# Patient Record
(demographics unavailable — no encounter records)

---

## 2025-01-06 NOTE — PHYSICAL EXAM
[Well Developed] : well developed [NAD] : in no acute distress [PERRL] : pupils were equal, round, reactive to light  [Moist & Pink Mucous Membranes] : moist and pink mucous membranes [CTAB] : lungs clear to auscultation bilaterally [Regular Rate and Rhythm] : regular rate and rhythm [Normal S1, S2] : normal S1 and S2 [Soft] : soft  [Normal Bowel Sounds] : normal bowel sounds [No HSM] : no hepatosplenomegaly appreciated [Normal Tone] : normal tone [Well-Perfused] : well-perfused [Interactive] : interactive [icteric] : anicteric [Respiratory Distress] : no respiratory distress  [Distended] : non distended [Tender] : non tender [Normal rectal exam] : exam was normal [Normal External Genitalia] : normal external genitalia [Amos Stage ___] : Amos stage [unfilled] [Edema] : no edema [Cyanosis] : no cyanosis [Rash] : no rash [Jaundice] : no jaundice [FreeTextEntry1] : with eyeglasses

## 2025-01-06 NOTE — CONSULT LETTER
[Dear  ___] : Dear  [unfilled], [Consult Letter:] : I had the pleasure of evaluating your patient, [unfilled]. [Please see my note below.] : Please see my note below. [Consult Closing:] : Thank you very much for allowing me to participate in the care of this patient.  If you have any questions, please do not hesitate to contact me. [Sincerely,] : Sincerely, [FreeTextEntry3] : Jose Rafael Coto MD Division of Pediatric Gastroenterology [facility]

## 2025-01-06 NOTE — ASSESSMENT
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 DGBIs are typically a diagnosis of exclusion in individuals meeting symptomatic criteria; and given maternal uncle with Crohn's disease would assess further for a systemic or inflammatory autoimmune process including celiac disease. Other potential etiologies include but are not limited to infection, lactose intolerance or other malabsorptive process including sugar alcohols as well as small intestinal bacterial overgrowth.   Plan: lab assessment stool eval diet changes - remove all soda and caffeine robert Dr. Pepper consider trial of probiotic consider trial off dairy f/u appt with an event diary consider further assessment with lactose breath test pending clinical status and above results

## 2025-01-06 NOTE — HISTORY OF PRESENT ILLNESS
[de-identified] : 15 year old female with frequent bowel movements.  Has to run to the bathroom after eating.  Has up to 6 BMs a day, [place] 7. No blood in stool.  No soiling. Unsure when changed to loose stool.  Doesn't wake at night to defecate. No c/o abd pain. Nausea at times. No vomiting. No inc eructation. Inc flatulence. LMP - Dec 15, 2024 ROS: eyeglasses Diet Hx obtained water, Dr. Pepper, no coffee or milk oatmeal or pancakes or eggs, pizza, meat or chicken ROS: eyeglasses No Sx Family Hx: mat 1/2 uncle Crohn's disease, sister with neutropenia which resolved, mother and MGM - IBS Mother realtor

## 2025-02-21 NOTE — CONSULT LETTER
[Dear  ___] : Dear  [unfilled], [Courtesy Letter:] : I had the pleasure of seeing your patient, [unfilled], in my office today. [Please see my note below.] : Please see my note below. [Consult Closing:] : Thank you very much for allowing me to participate in the care of this patient.  If you have any questions, please do not hesitate to contact me. [Sincerely,] : Sincerely, [FreeTextEntry3] : JONATHAN Chilel MD Division of Pediatric Gastroenterology [facility]

## 2025-02-21 NOTE — PHYSICAL EXAM
[Well Developed] : well developed [NAD] : in no acute distress [Moist & Pink Mucous Membranes] : moist and pink mucous membranes [CTAB] : lungs clear to auscultation bilaterally [Soft] : soft  [No HSM] : no hepatosplenomegaly appreciated [Normal Tone] : normal tone [Well-Perfused] : well-perfused [Interactive] : interactive [icteric] : anicteric [Respiratory Distress] : no respiratory distress  [Distended] : non distended [Tender] : non tender [Edema] : no edema [Cyanosis] : no cyanosis [Rash] : no rash [Jaundice] : no jaundice [FreeTextEntry1] : with eyeglasses

## 2025-02-21 NOTE — HISTORY OF PRESENT ILLNESS
[Crohn's Disease] : Crohn's Disease  [Ileum] : ileum [Colon] : colon [Perianal Disease] : The patient does not have perianal disease. [de-identified] : 2025 [de-identified] : 15 year old just diagnosed with ileocolonic Crohn's disease after presenting with anemia, slow weight gain, elevated inflammatory markers and frequent bowel movements.  She has an upper endoscopy with colonoscopy on Feb 18th which demonstrated a grossly and histologically normal upper endoscopy. Colonoscopy with patches of erythematous mucosa with areas of pus and loss of normal vascular pattern, worse in the proximal colon, cobble stoning in transverse colon & TI- biopsies consistent with Crohns both grossly and histologically  Here today to discuss diagnosis and next steps. She has infrequent abdominal pain, bowel movements are 6-8x/day, semi formed, no gross blood. Occasional urgency. No nocturnal awakening. + Weight loss despite reports of excellent appetite. Very active in theatre program at school. Parents plan to obtain 2nd opinion.   Feb 2025 Varicella IgG + Hep B antigen -, Hep B antibody + Quantiferon -  Calprotectin 4210  ROS: eyeglasses No Sx Family Hx: maternal uncle fistulizing Crohn's disease with ostomy, sister with congenital neutropenia

## 2025-02-21 NOTE — ASSESSMENT
[Educated Patient & Family about Diagnosis] : educated the patient and family about the diagnosis [FreeTextEntry1] : Natalie is a 15 year old female newly diagnosed with ileocolonic Crohn's disease after presenting with slow weight gain, iron def anemia with elevated inflammatory markers and frequent bowel movements. Today we reviewed the IBD diagnosis in detail including pathophysiology, natural history, important extraintestinal manifestations, and risk of complicated disease behavior over time. We further discussed the treat to target paradigm of therapy and various treatment options that exist today for IBD.   Of the available treatment options, we discussed anti-TNF medications, specifically infliximab, in more detail including both the benefits and the potential risks including but not limited to allergic reaction, infection, development of other autoimmune phenomena and malignancy. Importantly, TNF inhibitors used as monotherapy have not been found in large studies to increase risk of developing non-melanoma malignancy such as lymphomas.  Will need further evaluation with MRE which family intends to schedule. Family expressed desire to obtain a second opinion. Requested an update and discussion of whether her care will remain at [facility] and when to initiate the authorization for infliximab while understanding that her disease will progress without proper treatment. Family agreeable to plan.

## 2025-02-21 NOTE — END OF VISIT
[FreeTextEntry3] : I, , personally performed the evaluation and management (E/M) services for this established patient who presents today with a new diagnosis of IBD. That E/M includes conducting the clinically appropriate interval history &/or exam, assessing all new/exacerbated conditions, and establishing a new plan of care. Today, my YANNICK, [employer], was here to observe my evaluation and management service for this new problem/exacerbated condition and follow the plan of care established by me going forward.  [Time Spent: ___ minutes] : I have spent [unfilled] minutes of time on the encounter which excludes teaching and separately reported services.

## 2025-03-16 NOTE — CONSULT LETTER
[Dear  ___] : Dear  [unfilled], [Consult Letter:] : I had the pleasure of evaluating your patient, [unfilled]. [Please see my note below.] : Please see my note below. [Consult Closing:] : Thank you very much for allowing me to participate in the care of this patient.  If you have any questions, please do not hesitate to contact me. [Sincerely,] : Sincerely, [FreeTextEntry3] : Nata Thomas MD Attending Physician, Pediatric Gastroenterology [facility]

## 2025-03-16 NOTE — HISTORY OF PRESENT ILLNESS
[de-identified] : The patient presents for follow-up of IBD - Crohn's Disease . The location of disease involvement include(s) ileum and colon. The patient does not have perianal disease. The condition was diagnosed in 2025. She presents today for second opinion.   15 year old just diagnosed with ileocolonic Crohn's disease after presenting with anemia, slow weight gain, elevated inflammatory markers and frequent bowel movements. Symptoms started approximately 5 months ago (October 2024). Associated knee pain with some swelling which has resolved. US, negative for VTE, XR negative for fracture. Also with oral ulcers.   She has an upper endoscopy with colonoscopy on Feb 18th which demonstrated a grossly and histologically normal upper endoscopy. Colonoscopy with patches of erythematous mucosa with areas of pus and loss of normal vascular pattern, worse in the proximal colon, cobble stoning in transverse colon & TI- biopsies consistent with Crohns both grossly and histologically  Feb 2025 Varicella IgG + Hep B antigen -, Hep B antibody + Quantiferon - Calprotectin 4210  Current symptoms: Feels well today Abdominal pain, occasional  Stools 6-8x/24hrs, no nocturnal awakenings, +urgency, +incontinence (rare) Significant fatigue

## 2025-03-16 NOTE — ASSESSMENT
[Educated Patient & Family about Diagnosis] : educated the patient and family about the diagnosis [FreeTextEntry1] : 14yo F with ileocolonic Crohn's disease here for second opinion. Agree with diagnosis of Crohn's disease based on clinical history, labs, endoscopy and pathology. Discussed possible therapeutic options based on phenotype and clinical activity, specifically infliximab and risankizumab. Risks, benefits, administration for both drugs discussed in detail. Also discussed possible use of antibiotics as bridge therapy. Natalie and her mother will further discuss options and call office with final decision.

## 2025-03-16 NOTE — ASSESSMENT
[Educated Patient & Family about Diagnosis] : educated the patient and family about the diagnosis [FreeTextEntry1] : 16yo F with ileocolonic Crohn's disease here for second opinion. Agree with diagnosis of Crohn's disease based on clinical history, labs, endoscopy and pathology. Discussed possible therapeutic options based on phenotype and clinical activity, specifically infliximab and risankizumab. Risks, benefits, administration for both drugs discussed in detail. Also discussed possible use of antibiotics as bridge therapy. Natalie and her mother will further discuss options and call office with final decision.

## 2025-03-16 NOTE — CONSULT LETTER
[Dear  ___] : Dear  [unfilled], [Consult Letter:] : I had the pleasure of evaluating your patient, [unfilled]. [Please see my note below.] : Please see my note below. [Consult Closing:] : Thank you very much for allowing me to participate in the care of this patient.  If you have any questions, please do not hesitate to contact me. [Sincerely,] : Sincerely, [FreeTextEntry3] : Nata Thomas MD Attending Physician, Pediatric Gastroenterology [facility]

## 2025-03-16 NOTE — HISTORY OF PRESENT ILLNESS
[de-identified] : The patient presents for follow-up of IBD - Crohn's Disease . The location of disease involvement include(s) ileum and colon. The patient does not have perianal disease. The condition was diagnosed in 2025. She presents today for second opinion.   15 year old just diagnosed with ileocolonic Crohn's disease after presenting with anemia, slow weight gain, elevated inflammatory markers and frequent bowel movements. Symptoms started approximately 5 months ago (October 2024). Associated knee pain with some swelling which has resolved. US, negative for VTE, XR negative for fracture. Also with oral ulcers.   She has an upper endoscopy with colonoscopy on Feb 18th which demonstrated a grossly and histologically normal upper endoscopy. Colonoscopy with patches of erythematous mucosa with areas of pus and loss of normal vascular pattern, worse in the proximal colon, cobble stoning in transverse colon & TI- biopsies consistent with Crohns both grossly and histologically  Feb 2025 Varicella IgG + Hep B antigen -, Hep B antibody + Quantiferon - Calprotectin 4210  Current symptoms: Feels well today Abdominal pain, occasional  Stools 6-8x/24hrs, no nocturnal awakenings, +urgency, +incontinence (rare) Significant fatigue

## 2025-04-03 NOTE — ASSESSMENT
[Educated Patient & Family about Diagnosis] : educated the patient and family about the diagnosis [FreeTextEntry1] : 14yo F with ileocolonic Crohn's with clinical response on infliximab monotherapy. Presents for urgent visit due to reported perianal swelling and tenderness which has resolved since yesterday but now with recurrent leg pain. Perianal exam, including buttocks all normal. Natalie has mild tenderness to the back of the right knee without swelling, erythema. DDx includes arthritis (less likely), arthralgia or enesthitis, which is most likely extraintestinal manifestation of Crohn's disease. Given symptomatic response expect resolution of these symptoms with continued therapy with infliximab. Discussed ED precautions.

## 2025-04-03 NOTE — HISTORY OF PRESENT ILLNESS
[de-identified] : The patient presents for follow-up of IBD - Crohn's Disease. The location of disease involvement include(s) ileum and colon. The patient does not have perianal disease. The condition was diagnosed in 2025. She presents today for urgent follow up.   IBD History:  15 year old just diagnosed with ileocolonic Crohn's disease after presenting with anemia, slow weight gain, elevated inflammatory markers and frequent bowel movements. Symptoms started approximately 5 months ago (October 2024). Associated knee pain with some swelling which has resolved. US, negative for VTE, XR negative for fracture. Also with oral ulcers.  She has an upper endoscopy with colonoscopy on Feb 18th which demonstrated a grossly and histologically normal upper endoscopy. Colonoscopy with patches of erythematous mucosa with areas of pus and loss of normal vascular pattern, worse in the proximal colon, cobble stoning in transverse colon & TI- biopsies consistent with Crohns both grossly and histologically  Feb 2025 Varicella IgG + Hep B antigen -, Hep B antibody + Quantiferon - Calprotectin 4210  Interval history: Currently undergoing IFX induction  Received IFX #1 3/12 and IFX #2 3/24 MRE normal.  Week 2 IFX level 39 Decrease in symptoms following infusions   Current symptoms: Feels well today Came in due to reported swelling of left buttock. No redness, erythema, discharge. This has now resolved and she is reporting pain behind her right knee. No swelling or erythema.  Abdominal pain resolved last few days  Stools 4x/24hrs (decreased from 10x daily), no nocturnal awakenings, +urgency, no incontinence Never had blood in stool  Decreased fatigue

## 2025-04-03 NOTE — PHYSICAL EXAM
[Well Developed] : well developed [Well Nourished] : well nourished [NAD] : in no acute distress [PERRL] : pupils were equal, round, reactive to light  [icteric] : anicteric [Moist & Pink Mucous Membranes] : moist and pink mucous membranes [Respiratory Distress] : no respiratory distress  [Distended] : non distended [Joint Swelling] : no joint swelling [Normal Tone] : normal tone [Well-Perfused] : well-perfused [Edema] : no edema [Cyanosis] : no cyanosis [Rash] : no rash [Jaundice] : no jaundice [Interactive] : interactive [FreeTextEntry5] : regular rate, extremities warm, well perfused [de-identified] : no perianal lesions, normal buttocks, no drainage, fistula, erythema or tenderness [de-identified] : mild tenderness to the back of the right knee

## 2025-04-03 NOTE — CONSULT LETTER
[Dear  ___] : Dear  [unfilled], [Consult Letter:] : I had the pleasure of evaluating your patient, [unfilled]. [Please see my note below.] : Please see my note below. [Consult Closing:] : Thank you very much for allowing me to participate in the care of this patient.  If you have any questions, please do not hesitate to contact me. [Sincerely,] : Sincerely, [FreeTextEntry3] : Nata Thomas MD Attending Physician, Pediatric Gastroenterology [facility]

## 2025-04-15 NOTE — PHYSICAL EXAM
[Erythematous Oropharynx] : erythematous oropharynx [NL] : no abnormal lymph nodes palpated [FreeTextEntry4] : mild congestion

## 2025-04-15 NOTE — HISTORY OF PRESENT ILLNESS
[de-identified] : fever x 4 days [FreeTextEntry6] : Tmax 99 relieved with Tylenol sore throat myalgias Recently Diagnosed with Crohn's but this nausea pt states is different than how she feels with Crohn's No vomiting Stools are baseline

## 2025-04-15 NOTE — DISCUSSION/SUMMARY
[FreeTextEntry1] : Symptomatic treatment of fever and/or pain discussed Stat strep test ordered Throat culture, if POSITIVE, give Amoxicillin 400mg/5ml 10ml BID x 10 days Hydrate well Handwashing and infection control discussed Return to office if febrile > 48 hours or if symptoms get worse Go to ER if unable to come to the office or during after hours, parent encouraged to call service first before doing so.

## 2025-06-09 NOTE — PHYSICAL EXAM
[Well Developed] : well developed [NAD] : in no acute distress [Thin] : thin [Pallor] : pallor [PERRL] : pupils were equal, round, reactive to light  [Moist & Pink Mucous Membranes] : moist and pink mucous membranes [Normal Oropharynx] : the oropharynx was normal [CTAB] : lungs clear to auscultation bilaterally [Regular Rate and Rhythm] : regular rate and rhythm [Normal S1, S2] : normal S1 and S2 [Soft] : soft  [Normal Bowel Sounds] : normal bowel sounds [No HSM] : no hepatosplenomegaly appreciated [Well-Perfused] : well-perfused [Normal Tone] : normal tone [Interactive] : interactive [Alert and Active] : alert and active [icteric] : anicteric [Oral Ulcers] : no oral ulcers [Respiratory Distress] : no respiratory distress  [Distended] : non distended [Tender] : non tender [Edema] : no edema [Cyanosis] : no cyanosis [Jaundice] : no jaundice [Rash] : no rash

## 2025-06-09 NOTE — ASSESSMENT
[Educated Patient & Family about Diagnosis] : educated the patient and family about the diagnosis [FreeTextEntry1] : 16yo F with ileocolonic Crohn's with initial clinical response on infliximab monotherapy however now with recurrent symptoms despite optimized drug levels consistent with secondary loss of response. Recent endoscopic reassessment consistent with progression of disease despite infliximab. Given moderate to severe disease activity based on symptoms and endoscopic findings, plan to transition therapy to MAYO inhibitor, upadacitinib. Discussed risks, benefits, administration and alternatives today in detail. PA pending. Baseline labs to be obtained today.   Parents and patient verbalized understanding and agreed with plan

## 2025-06-09 NOTE — CONSULT LETTER
[Dear  ___] : Dear  [unfilled], [Please see my note below.] : Please see my note below. [Consult Letter:] : I had the pleasure of evaluating your patient, [unfilled]. [Consult Closing:] : Thank you very much for allowing me to participate in the care of this patient.  If you have any questions, please do not hesitate to contact me. [Sincerely,] : Sincerely, [FreeTextEntry3] : Nata Thomas MD Attending Physician, Pediatric Gastroenterology [facility]

## 2025-06-09 NOTE — HISTORY OF PRESENT ILLNESS
[de-identified] : The patient presents for follow-up of IBD - Crohn's Disease. The location of disease involvement include(s) ileum and colon. The patient does not have perianal disease. The condition was diagnosed in 2025. She presents today for urgent follow up.  IBD History: 15 year old just diagnosed with ileocolonic Crohn's disease after presenting with anemia, slow weight gain, elevated inflammatory markers and frequent bowel movements. Symptoms started approximately 5 months ago (October 2024). Associated knee pain with some swelling which has resolved. US, negative for VTE, XR negative for fracture. Also with oral ulcers.  She has an upper endoscopy with colonoscopy on Feb 18th which demonstrated a grossly and histologically normal upper endoscopy. Colonoscopy with patches of erythematous mucosa with areas of pus and loss of normal vascular pattern, worse in the proximal colon, cobble stoning in transverse colon & TI- biopsies consistent with Crohns both grossly and histologically  Feb 2025 Varicella IgG + Hep B antigen -, Hep B antibody + Quantiferon - Calprotectin 4210  Underwent IFX induction and started maintenance therapy  Received IFX #1 3/12  MRE normal. Week 2 IFX level 39, Week 6 level 18  Interval History:  Following each infusion, patient with initial symptomatic response and remission However with each subsequent infusion, Natalie experienced a waning response (less days well, not attaining remission in between infusions) Most recent IFX level 24 (3.5 weeks interval) Repeat colonoscopy completed 6/2/25: severe confluent colitis in rectum, sigmoid, descending and distal transverse colon. Procedure aborted just proximal to splenic flexure due to severity of disease.  Pathology consistent with chronic active colitis, no viral cytopathic effect. Negative CMV staining.   Current symptoms: Feels very fatigued, but trying to go to school (even if late) Pre defecatory abdominal pain  Stools 6x/24hrs, watery, nonbloody, occasional nocturnal awakenings, +urgency, +incontinence Never had blood in stool 8lb weight loss in 2 months

## 2025-07-08 NOTE — REASON FOR VISIT
[Home] : at home, [unfilled] , at the time of the visit. [Medical Office: ([facility])___] : at the medical office located in  [Telehealth (audio & video)] : This visit was provided via telehealth using real-time 2-way audio visual technology. [Initial Eval - Existing Diagnosis] : an initial evaluation of an existing diagnosis [Patient] : patient [Mother] : mother [FreeTextEntry3] : mother

## 2025-07-08 NOTE — HISTORY OF PRESENT ILLNESS
[FreeTextEntry1] : Nutritionist intake: Natalie is a 15-year-old girl with Crohn's disease. She was diagnosed with ileocolonic Crohn's disease after presenting with anemia, slow weight gain, elevated inflammatory markers and frequent bowel movements. She is followed by Dr. Thomas. She was referred to nutrition for education on an anti-inflammatory and Mediterranean diet.  Weight: 45.6kg (16th %tile weight/age), BMI at 17th %tile She has lost weight since April. 49 kg down to 45.6 kg, 7% weight loss  Natalie eats a regular diet: B: eggs, toast, waffles L: sandwich (sliced chicken and cheese), pasta D: mostly mom cooks: chicken, tacos. They do pizza 1x/week snacks: chips, strawberries, chocolate beverages: water, iced tea, soda, vitamin water